# Patient Record
Sex: FEMALE | Race: WHITE | Employment: FULL TIME | URBAN - METROPOLITAN AREA
[De-identification: names, ages, dates, MRNs, and addresses within clinical notes are randomized per-mention and may not be internally consistent; named-entity substitution may affect disease eponyms.]

---

## 2019-11-05 ENCOUNTER — OFFICE VISIT (OUTPATIENT)
Dept: FAMILY MEDICINE CLINIC | Facility: CLINIC | Age: 34
End: 2019-11-05
Payer: COMMERCIAL

## 2019-11-05 VITALS
BODY MASS INDEX: 24.2 KG/M2 | HEIGHT: 63.75 IN | HEART RATE: 80 BPM | TEMPERATURE: 98 F | OXYGEN SATURATION: 99 % | WEIGHT: 140 LBS | RESPIRATION RATE: 16 BRPM | DIASTOLIC BLOOD PRESSURE: 93 MMHG | SYSTOLIC BLOOD PRESSURE: 128 MMHG

## 2019-11-05 DIAGNOSIS — R03.0 ELEVATED BLOOD PRESSURE READING: ICD-10-CM

## 2019-11-05 DIAGNOSIS — Z11.1 ENCOUNTER FOR PPD TEST: ICD-10-CM

## 2019-11-05 DIAGNOSIS — Z02.1 PHYSICAL EXAM, PRE-EMPLOYMENT: Primary | ICD-10-CM

## 2019-11-05 PROCEDURE — 99395 PREV VISIT EST AGE 18-39: CPT | Performed by: NURSE PRACTITIONER

## 2019-11-05 NOTE — PROGRESS NOTES
CHIEF COMPLAINT:     Patient presents with:  Employment Physical      HPI:   Shara Monteiro is a 30year old female who presents for an employment physical exam    Patient has concerns no medical concerns    To the best of your knowledge are you up to d Occ:   : Yes  Children: 1 . Exercise: three times per week  Diet: watches minimally and \"work in progress\"     REVIEW OF SYSTEMS:   GENERAL: Feels well. No night sweats. No Fevers.    SKIN: denies any unusual skin lesions  EYES:d CHEST: no chest tenderness  LUNGS: Clear to auscultation bilaterally. No diminished breath sounds. No wheezing, rhonchi, or rales. CARDIO: RRR without murmur  GI: BS's present x4., No tenderness of palpation.   No obvious masses or palpable organomegaly Please return to clinic on 11/7/19 between 3:15PM and 7:15PM or on 11/8/19 between 8AM and 2:30PM have your TB test read. If you do not return during this time your test will need to be repeated.      Our clinic hours are:  Monday-Friday        8:00 am t · Tighten your stomach muscles as you push. Date Last Reviewed: 3/1/2018  © 3244-5100 [employer]. [address] [facility] [place], [address] [place] [address]. All rights reserved.  This information is not intended as a substitute for professional medical care © 4718-7954 [employer]. 1407 [facility] [place], [facility] [place]. All rights reserved. This information is not intended as a substitute for professional medical care. Always follow your healthcare professional's instructions.

## 2019-11-05 NOTE — PATIENT INSTRUCTIONS
Hepatitis A vaccine recommended       You will need to return to clinic in 48-72 hours to have results of TB test read. Please return to clinic on 11/7/19 between 3:15PM and 7:15PM or on 11/8/19 between 8AM and 2:30PM have your TB test read.     If you d instead:  · Push with both arms, keeping your elbows bent. · Stay close to the load, without leaning forward. · Tighten your stomach muscles as you push. Date Last Reviewed: 3/1/2018  © 4684-0572 [employer].  [employer] 140[address] [facility] [place], [address] [place] [address]. All rights reserved. This information is not intended as a substitute for professional medical care. Always follow your healthcare professional's instructions.

## 2019-11-08 ENCOUNTER — OFFICE VISIT (OUTPATIENT)
Dept: FAMILY MEDICINE CLINIC | Facility: CLINIC | Age: 34
End: 2019-11-08
Payer: COMMERCIAL

## 2019-11-08 DIAGNOSIS — Z11.1 SCREENING FOR TUBERCULOSIS: Primary | ICD-10-CM

## 2019-11-08 NOTE — PROGRESS NOTES
Patient presents for PPD read. Test placed on right forearm    Results: 0 mm induration. Letter of results printed and given patient. No further questions or concerns at this time.

## 2019-12-02 ENCOUNTER — OFFICE VISIT (OUTPATIENT)
Dept: FAMILY MEDICINE CLINIC | Facility: CLINIC | Age: 34
End: 2019-12-02
Payer: COMMERCIAL

## 2019-12-02 DIAGNOSIS — J02.9 ACUTE PHARYNGITIS, UNSPECIFIED ETIOLOGY: Primary | ICD-10-CM

## 2019-12-02 DIAGNOSIS — H01.00A BLEPHARITIS OF BOTH UPPER AND LOWER EYELID OF RIGHT EYE, UNSPECIFIED TYPE: ICD-10-CM

## 2019-12-02 PROCEDURE — 99213 OFFICE O/P EST LOW 20 MIN: CPT

## 2019-12-02 PROCEDURE — 87880 STREP A ASSAY W/OPTIC: CPT

## 2019-12-02 RX ORDER — ERYTHROMYCIN 5 MG/G
OINTMENT OPHTHALMIC
Qty: 3.5 G | Refills: 0 | Status: SHIPPED | OUTPATIENT
Start: 2019-12-02

## 2019-12-02 NOTE — PROGRESS NOTES
CHIEF COMPLAINT:   Patient presents with:  Eye Problem: 1 day  Sore Throat: 2 days      HPI:   Morena Kim is a 30year old female who presents to clinic with symptoms of sore throat. Patient has had for 2 days. Symptoms have been worse since onset. EYES: Left  Conjunctiva is clear and no d/c. Right blepharal and palebral injection but bulbar conjunctiva is clear, with suborbital venocongestion and scant right lower lid swelling without skin redness. EOM intact. No fluorescein uptake.  No pus or d/c at Risks, benefits, complications and side effects of meds discussed with patient. OTC Tylenol/Motrin prn.    Push fluids- warm or cool liquids, whichever is soothing for patient  If antibiotics prescribed, change tooth brush after on medication for 48 brea

## (undated) NOTE — LETTER
Date: 12/2/2019    Patient Name: Sadia Del Cid          To Whom it may concern: This letter has been written at the patient's request. The above patient was seen at the [facility] for treatment of a medical condition.     The patient ma

## (undated) NOTE — LETTER
November 8, 2019    Donal Valver[phone]      Dear Gadiel Davalos: The following are the results of your recent tests. Please review the list of test results.   Your result is the value on the left; we have also supplied the